# Patient Record
Sex: FEMALE | Race: WHITE | NOT HISPANIC OR LATINO | Employment: FULL TIME | URBAN - METROPOLITAN AREA
[De-identification: names, ages, dates, MRNs, and addresses within clinical notes are randomized per-mention and may not be internally consistent; named-entity substitution may affect disease eponyms.]

---

## 2018-08-11 ENCOUNTER — APPOINTMENT (OUTPATIENT)
Dept: RADIOLOGY | Facility: MEDICAL CENTER | Age: 58
End: 2018-08-11
Attending: EMERGENCY MEDICINE
Payer: OTHER MISCELLANEOUS

## 2018-08-11 ENCOUNTER — HOSPITAL ENCOUNTER (EMERGENCY)
Facility: MEDICAL CENTER | Age: 58
End: 2018-08-12
Attending: EMERGENCY MEDICINE
Payer: OTHER MISCELLANEOUS

## 2018-08-11 DIAGNOSIS — S90.32XA CONTUSION OF LEFT FOOT, INITIAL ENCOUNTER: ICD-10-CM

## 2018-08-11 PROCEDURE — 73630 X-RAY EXAM OF FOOT: CPT | Mod: LT

## 2018-08-11 PROCEDURE — 99283 EMERGENCY DEPT VISIT LOW MDM: CPT

## 2018-08-11 PROCEDURE — A9270 NON-COVERED ITEM OR SERVICE: HCPCS | Performed by: EMERGENCY MEDICINE

## 2018-08-11 PROCEDURE — 700102 HCHG RX REV CODE 250 W/ 637 OVERRIDE(OP): Performed by: EMERGENCY MEDICINE

## 2018-08-11 RX ORDER — IBUPROFEN 600 MG/1
600 TABLET ORAL ONCE
Status: COMPLETED | OUTPATIENT
Start: 2018-08-12 | End: 2018-08-11

## 2018-08-11 RX ADMIN — IBUPROFEN 600 MG: 600 TABLET, FILM COATED ORAL at 23:59

## 2018-08-11 ASSESSMENT — PAIN SCALES - GENERAL
PAINLEVEL_OUTOF10: 4
PAINLEVEL_OUTOF10: 4

## 2018-08-12 VITALS
HEART RATE: 82 BPM | HEIGHT: 68 IN | OXYGEN SATURATION: 97 % | SYSTOLIC BLOOD PRESSURE: 148 MMHG | BODY MASS INDEX: 32.43 KG/M2 | DIASTOLIC BLOOD PRESSURE: 91 MMHG | WEIGHT: 214 LBS | TEMPERATURE: 97.1 F | RESPIRATION RATE: 20 BRPM

## 2018-08-12 NOTE — ED TRIAGE NOTES
"Eloisa Anatejas Jamil    Chief Complaint   Patient presents with   • Foot Pain   • Foot Swelling       Pt ambulatory to triage with above complaint. Pt had rstuarant wooden sign fall on L foot. CMS intact.  Hematoma noted on top of foot.  VSS.  Pt returned to [facility], educated on triage process, and to inform staff of any changes or concerns.      Blood Pressure: (!) 161/109, Pulse: 99, Respiration: 16, Temperature: 36.2 °C (97.1 °F), Height: 172.7 cm (5' 8\"), Weight: 97.1 kg (214 lb), Pulse Oximetry: 97 %, O2 Delivery: None (Room Air)      "

## 2018-08-12 NOTE — ED PROVIDER NOTES
"ED Provider Note      Chief Complaint   Patient presents with   • Foot Pain   • Foot Swelling       HPI  Eloisa Jamil is a 58 y.o. female who presents for evaluation of pain to her left foot.  The patient had a restaurant sign fall on her left foot she developed pain swelling and now presents for evaluation.  She denies any ankle pain knee pain or any other injuries.    REVIEW OF SYSTEMS  See HPI for further details.  She has pain with weightbearing no other complaints were noted    PAST MEDICAL HISTORY  History reviewed. No pertinent past medical history.    FAMILY HISTORY  History reviewed. No pertinent family history.    SOCIAL HISTORY  Social History     Social History   • Marital status:      Spouse name: N/A   • Number of children: N/A   • Years of education: N/A     Social History Main Topics   • Smoking status: Never Smoker   • Smokeless tobacco: Never Used   • Alcohol use No   • Drug use: No   • Sexual activity: Not on file     Other Topics Concern   • Not on file     Social History Narrative   • No narrative on file       SURGICAL HISTORY  History reviewed. No pertinent surgical history.    CURRENT MEDICATIONS  Home Medications     Reviewed by Gagan Dodd R.N. (Registered Nurse) on 08/11/18 at 2225  Med List Status: Partial   Medication Last Dose Status   amitriptyline (ELAVIL) 25 MG TABS Taking Active   cyclobenzaprine (FLEXERIL) 10 MG TABS Taking Active   phenazopyridine (PYRIDIUM) 200 MG TABS  Active                 ALLERGIES  Allergies   Allergen Reactions   • Codeine Vomiting       PHYSICAL EXAM  VITAL SIGNS: BP (!) 161/109   Pulse 99   Temp 36.2 °C (97.1 °F)   Resp 16   Ht 1.727 m (5' 8\")   Wt 97.1 kg (214 lb)   SpO2 97%   BMI 32.54 kg/m²   Constitutional :  Well developed, Well nourished, No acute distress, Non-toxic appearance.   HENT: Head is atraumatic, normocephalic  Eyes: Normal-appearing nonicteric  Neck: Normal range of motion, No tenderness, Supple, No stridor. "   Thorax & Lungs: No respiratory distress is noted  Skin: Warm, Dry, No erythema, No rash.   Left foot is notable for swelling soft tissue swelling over the dorsum of foot, no ankle or knee tenderness is noted      DX-FOOT-COMPLETE 3+ LEFT   Final Result      No evidence of acute fracture or dislocation.              COURSE & MEDICAL DECISION MAKING  Pertinent Labs & Imaging studies reviewed. (See chart for details)  The patient presenting for evaluation of injury caused by a sign falling on her foot.  X-rays show no evidence of acute fracture.  The patient injury is consistent with a contusion of her foot.  She is given ibuprofen 600 mg acutely in the emergency department for pain she is advised to elevate and ice the foot.  She has no other injury discharged in stable condition.  It is noted that her blood pressure is elevated.  She has been aware that she has intermittent high blood pressure and this is followed up by her doctor    FINAL IMPRESSION  1.  Contusion of left foot  2.  Hypertension       Electronically signed by: Jose Murphy, 8/11/2018

## 2018-08-12 NOTE — ED TRIAGE NOTES
"Pt to rm 65 per w/c, c/o pain/swelling to left foot, states \"a wooden sign fell on it\", bruising / swelling noted , <3 sec cap refill to left toes noted, rom intact, ice pack placed to site  "